# Patient Record
Sex: FEMALE | Race: WHITE | NOT HISPANIC OR LATINO | Employment: OTHER | URBAN - METROPOLITAN AREA
[De-identification: names, ages, dates, MRNs, and addresses within clinical notes are randomized per-mention and may not be internally consistent; named-entity substitution may affect disease eponyms.]

---

## 2020-10-04 ENCOUNTER — APPOINTMENT (EMERGENCY)
Dept: RADIOLOGY | Facility: HOSPITAL | Age: 40
End: 2020-10-04

## 2020-10-04 ENCOUNTER — HOSPITAL ENCOUNTER (EMERGENCY)
Facility: HOSPITAL | Age: 40
Discharge: HOME | End: 2020-10-04
Attending: EMERGENCY MEDICINE | Admitting: EMERGENCY MEDICINE

## 2020-10-04 VITALS
OXYGEN SATURATION: 100 % | HEART RATE: 71 BPM | WEIGHT: 289 LBS | SYSTOLIC BLOOD PRESSURE: 136 MMHG | RESPIRATION RATE: 16 BRPM | TEMPERATURE: 97.7 F | DIASTOLIC BLOOD PRESSURE: 83 MMHG

## 2020-10-04 DIAGNOSIS — S43.401A SPRAIN OF RIGHT SHOULDER: Primary | ICD-10-CM

## 2020-10-04 PROCEDURE — 99284 EMERGENCY DEPT VISIT MOD MDM: CPT | Performed by: EMERGENCY MEDICINE

## 2020-10-04 PROCEDURE — 73030 X-RAY EXAM OF SHOULDER: CPT

## 2020-10-04 PROCEDURE — 99283 EMERGENCY DEPT VISIT LOW MDM: CPT

## 2020-10-04 PROCEDURE — 73060 X-RAY EXAM OF HUMERUS: CPT

## 2020-10-04 RX ORDER — IBUPROFEN 400 MG/1
800 TABLET ORAL ONCE
Status: COMPLETED | OUTPATIENT
Start: 2020-10-04 | End: 2020-10-04

## 2020-10-04 RX ORDER — IBUPROFEN 800 MG/1
800 TABLET ORAL EVERY 6 HOURS PRN
Qty: 30 TABLET | Refills: 0 | Status: SHIPPED | OUTPATIENT
Start: 2020-10-04 | End: 2021-04-15 | Stop reason: ALTCHOICE

## 2020-10-04 RX ADMIN — IBUPROFEN 800 MG: 400 TABLET ORAL at 13:49

## 2021-04-15 ENCOUNTER — OFFICE VISIT (OUTPATIENT)
Dept: FAMILY MEDICINE CLINIC | Facility: CLINIC | Age: 41
End: 2021-04-15
Payer: COMMERCIAL

## 2021-04-15 ENCOUNTER — TELEPHONE (OUTPATIENT)
Dept: PSYCHIATRY | Facility: CLINIC | Age: 41
End: 2021-04-15

## 2021-04-15 VITALS
TEMPERATURE: 98 F | WEIGHT: 289 LBS | HEIGHT: 68 IN | BODY MASS INDEX: 43.8 KG/M2 | DIASTOLIC BLOOD PRESSURE: 84 MMHG | SYSTOLIC BLOOD PRESSURE: 112 MMHG | OXYGEN SATURATION: 98 %

## 2021-04-15 DIAGNOSIS — Z13.6 ENCOUNTER FOR LIPID SCREENING FOR CARDIOVASCULAR DISEASE: ICD-10-CM

## 2021-04-15 DIAGNOSIS — F33.1 MODERATE RECURRENT MAJOR DEPRESSION (HCC): Primary | ICD-10-CM

## 2021-04-15 DIAGNOSIS — Z12.31 ENCOUNTER FOR SCREENING MAMMOGRAM FOR BREAST CANCER: ICD-10-CM

## 2021-04-15 DIAGNOSIS — Z12.4 CERVICAL CANCER SCREENING: ICD-10-CM

## 2021-04-15 DIAGNOSIS — Z13.220 ENCOUNTER FOR LIPID SCREENING FOR CARDIOVASCULAR DISEASE: ICD-10-CM

## 2021-04-15 DIAGNOSIS — F17.200 TOBACCO DEPENDENCE: ICD-10-CM

## 2021-04-15 PROCEDURE — 99204 OFFICE O/P NEW MOD 45 MIN: CPT | Performed by: FAMILY MEDICINE

## 2021-04-15 PROCEDURE — 3725F SCREEN DEPRESSION PERFORMED: CPT | Performed by: FAMILY MEDICINE

## 2021-04-15 RX ORDER — DIPHENOXYLATE HYDROCHLORIDE AND ATROPINE SULFATE 2.5; .025 MG/1; MG/1
1 TABLET ORAL DAILY
COMMUNITY

## 2021-04-15 RX ORDER — VARENICLINE TARTRATE 25 MG
KIT ORAL
Qty: 53 TABLET | Refills: 0 | Status: SHIPPED | OUTPATIENT
Start: 2021-04-15 | End: 2021-04-19 | Stop reason: SDUPTHER

## 2021-04-15 RX ORDER — CITALOPRAM 10 MG/1
10 TABLET ORAL DAILY
Qty: 30 TABLET | Refills: 2 | Status: SHIPPED | OUTPATIENT
Start: 2021-04-15 | End: 2021-12-07

## 2021-04-15 RX ORDER — BUPROPION HYDROCHLORIDE 150 MG/1
150 TABLET ORAL EVERY MORNING
Qty: 30 TABLET | Refills: 5 | Status: CANCELLED | OUTPATIENT
Start: 2021-04-15

## 2021-04-15 RX ORDER — ERGOCALCIFEROL (VITAMIN D2) 1250 MCG
50000 CAPSULE ORAL 2 TIMES WEEKLY
COMMUNITY
Start: 2020-12-07
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
## 2021-04-15 NOTE — PATIENT INSTRUCTIONS
Depression   AMBULATORY CARE:   Depression  is a medical condition that causes feelings of sadness or hopelessness that do not go away  Depression may cause you to lose interest in things you used to enjoy  These feelings may interfere with your daily life  Common symptoms include the following:   · Appetite changes, or weight gain or loss    · Trouble going to sleep or staying asleep, or sleeping too much    · Fatigue or lack of energy    · Feeling restless, irritable, or withdrawn    · Feeling worthless, hopeless, discouraged, or guilty    · Trouble concentrating, remembering things, doing daily tasks, or making decisions    · Thoughts about hurting or killing yourself    Call your local emergency number (911 in the [address] [place] [address],[address]) if:   · You think about harming yourself or someone else  · You have done something on purpose to hurt yourself  Call your therapist or doctor if:   · Your symptoms do not improve  · You cannot make it to your next appointment  · You have new symptoms  · You have questions or concerns about your condition or care  The following resources are available at any time to help you, if needed:   · [address] [place] [address]: [phone] (3-877-886-VETW)     · Suicide Hotline: 3-639.241.5888 (4-117-JTRSKCK)     · For a list of international numbers: https://save org/find-help/international-resources/    Treatment for depression  may include medicine to relieve depression  Medicine is often used together with therapy  Therapy is a way for you to talk about your feelings and anything that may be causing depression  Therapy can be done alone or in a group  It may also be done with family members or a significant other  Self-care:   · Get regular physical activity  Try to be active for 30 minutes, 3 to 5 days a week  Physical activity can help relieve depression  Work with your healthcare provider to develop a plan that you enjoy   It may help to ask someone to be active with you  · Create a regular sleep schedule  A routine can help you relax before bed  Listen to music, read, or do yoga  Try to go to bed and wake up at the same time every day  Sleep is important for emotional health  · Eat a variety of healthy foods  Healthy foods include fruits, vegetables, whole-grain breads, low-fat dairy products, lean meats, fish, and cooked beans  A healthy meal plan is low in fat, salt, and added sugar  · Do not drink alcohol or use drugs  Alcohol and drugs can make depression worse  Talk to your therapist or doctor if you need help quitting  Follow up with your healthcare provider as directed: Your healthcare provider will monitor your progress at follow-up visits  He or she will also monitor your medicine if you take antidepressants  Your healthcare provider will ask if the medicine is helping  Tell him or her about any side effects or problems you may have with your medicine  The type or amount of medicine may need to be changed  Write down your questions so you remember to ask them during your visits  © Copyright [address] Information is for End User's use only and may not be sold, redistributed or otherwise used for commercial purposes  All illustrations and images included in CareNotes® are the copyrighted property of A D A M , Inc  or [address] [place], [place] [address]susie   The above information is an  only  It is not intended as medical advice for individual conditions or treatments  Talk to your doctor, nurse or pharmacist before following any medical regimen to see if it is safe and effective for you

## 2021-04-15 NOTE — TELEPHONE ENCOUNTER
Anibal Rm is looking to schedule an appointment for a Therapist  Patient has [facility] and can be reached anytime at [phone]  Patient does have a referral in the system

## 2021-04-19 ENCOUNTER — APPOINTMENT (OUTPATIENT)
Dept: LAB | Facility: HOSPITAL | Age: 41
End: 2021-04-19
Attending: FAMILY MEDICINE
Payer: COMMERCIAL

## 2021-04-19 DIAGNOSIS — F17.200 TOBACCO DEPENDENCE: ICD-10-CM

## 2021-04-19 DIAGNOSIS — Z13.220 ENCOUNTER FOR LIPID SCREENING FOR CARDIOVASCULAR DISEASE: ICD-10-CM

## 2021-04-19 DIAGNOSIS — F33.1 MODERATE RECURRENT MAJOR DEPRESSION (HCC): ICD-10-CM

## 2021-04-19 DIAGNOSIS — Z13.6 ENCOUNTER FOR LIPID SCREENING FOR CARDIOVASCULAR DISEASE: ICD-10-CM

## 2021-04-19 PROBLEM — E78.00 PURE HYPERCHOLESTEROLEMIA: Status: ACTIVE | Noted: 2021-04-19

## 2021-04-19 LAB
CHOLEST SERPL-MCNC: 206 MG/DL (ref 50–200)
HDLC SERPL-MCNC: 61 MG/DL
LDLC SERPL CALC-MCNC: 131 MG/DL (ref 0–100)
NONHDLC SERPL-MCNC: 145 MG/DL
TRIGL SERPL-MCNC: 69 MG/DL
TSH SERPL DL<=0.05 MIU/L-ACNC: 2.22 UIU/ML (ref 0.36–3.74)

## 2021-04-19 PROCEDURE — 36415 COLL VENOUS BLD VENIPUNCTURE: CPT

## 2021-04-19 PROCEDURE — 84443 ASSAY THYROID STIM HORMONE: CPT

## 2021-04-19 PROCEDURE — 80061 LIPID PANEL: CPT

## 2021-04-19 RX ORDER — VARENICLINE TARTRATE 25 MG
KIT ORAL
Qty: 53 TABLET | Refills: 0 | Status: SHIPPED | OUTPATIENT
Start: 2021-04-19 | End: 2021-05-13

## 2021-04-19 NOTE — TELEPHONE ENCOUNTER
Patient called stating her pharmacy did not receive the script for Chantix  Could you please resend?

## 2021-04-27 ENCOUNTER — HOSPITAL ENCOUNTER (OUTPATIENT)
Dept: MAMMOGRAPHY | Facility: CLINIC | Age: 41
Discharge: HOME | End: 2021-04-27
Payer: COMMERCIAL

## 2021-04-27 VITALS — HEIGHT: 68 IN | BODY MASS INDEX: 43.8 KG/M2 | WEIGHT: 289 LBS

## 2021-04-27 DIAGNOSIS — Z12.31 ENCOUNTER FOR SCREENING MAMMOGRAM FOR BREAST CANCER: ICD-10-CM

## 2021-04-27 PROCEDURE — 77067 SCR MAMMO BI INCL CAD: CPT

## 2021-04-27 PROCEDURE — 77063 BREAST TOMOSYNTHESIS BI: CPT

## 2021-04-29 ENCOUNTER — HOSPITAL ENCOUNTER (OUTPATIENT)
Dept: ULTRASOUND IMAGING | Facility: CLINIC | Age: 41
Discharge: HOME | End: 2021-04-29
Payer: COMMERCIAL

## 2021-04-29 ENCOUNTER — OFFICE VISIT (OUTPATIENT)
Dept: FAMILY MEDICINE CLINIC | Facility: CLINIC | Age: 41
End: 2021-04-29
Payer: COMMERCIAL

## 2021-04-29 VITALS
BODY MASS INDEX: 43.59 KG/M2 | HEART RATE: 83 BPM | SYSTOLIC BLOOD PRESSURE: 132 MMHG | OXYGEN SATURATION: 97 % | DIASTOLIC BLOOD PRESSURE: 84 MMHG | WEIGHT: 287.6 LBS | HEIGHT: 68 IN

## 2021-04-29 DIAGNOSIS — R92.8 ABNORMAL MAMMOGRAM: ICD-10-CM

## 2021-04-29 DIAGNOSIS — F17.200 TOBACCO DEPENDENCE: ICD-10-CM

## 2021-04-29 DIAGNOSIS — F33.1 MODERATE RECURRENT MAJOR DEPRESSION (HCC): Primary | ICD-10-CM

## 2021-04-29 PROCEDURE — 4004F PT TOBACCO SCREEN RCVD TLK: CPT | Performed by: FAMILY MEDICINE

## 2021-04-29 PROCEDURE — 3008F BODY MASS INDEX DOCD: CPT | Performed by: FAMILY MEDICINE

## 2021-04-29 PROCEDURE — 99213 OFFICE O/P EST LOW 20 MIN: CPT | Performed by: FAMILY MEDICINE

## 2021-04-29 PROCEDURE — 76642 ULTRASOUND BREAST LIMITED: CPT

## 2021-04-29 NOTE — PROGRESS NOTES
Assessment/Plan:    No problem-specific Assessment & Plan notes found for this encounter  Diagnoses and all orders for this visit:    Moderate recurrent major depression ([address] )  Taking Celexa and doing well  Denies side effects  Continue current dose and follow up  Tobacco dependence  Patient has been taking and tolerating the Chantix  She is still on her first week, smoking intermittently  Notes that she is smoking much less than usual          Subjective:      Patient ID: Carlos Crabtree is a 42 y o  female  HPI  Patient presents to the office for follow up on depression and smoking cessation  Patient states that she has been taking the Celexa and started the Chantix a few days ago  She feels that she is already calmer taking the Celexa  Notes that the Chantix seems to be helping her with craving  She has been smoking less  Notes that she has some craving stimulated by her partner smoking  Notes a little bit of chest tightness with cough this morning that has essentially resolved  She states that it feels like the "im quitting smoking cough"  The following portions of the patient's history were reviewed and updated as appropriate: allergies, current medications, past family history, past medical history, past social history, past surgical history and problem list     Review of Systems   Constitutional: Negative for chills, fatigue and fever  HENT: Negative for congestion, ear pain, postnasal drip, rhinorrhea, sinus pressure and sore throat  Respiratory: Positive for cough and chest tightness  Negative for shortness of breath  Cardiovascular: Negative for chest pain and leg swelling  Gastrointestinal: Negative for abdominal pain, constipation, diarrhea, nausea and vomiting  Skin: Negative for rash  Neurological: Negative for dizziness, light-headedness and headaches         Objective:    /84 (BP Location: Left arm, Patient Position: Sitting, Cuff Size: Large) Pulse 83   Ht 5' 8" (1 727 m)   Wt 130 kg (287 lb 9 6 oz)   LMP 04/05/2021   SpO2 97%   BMI 43 73 kg/m²      Physical Exam  Vitals signs reviewed  Constitutional:       General: She is not in acute distress  Appearance: Normal appearance  HENT:      Head: Normocephalic and atraumatic  Right Ear: External ear normal       Left Ear: External ear normal       Mouth/Throat:      Mouth: Mucous membranes are moist    Eyes:      Extraocular Movements: Extraocular movements intact  Conjunctiva/sclera: Conjunctivae normal    Cardiovascular:      Rate and Rhythm: Normal rate and regular rhythm  Heart sounds: Normal heart sounds  Pulmonary:      Effort: Pulmonary effort is normal       Breath sounds: Normal breath sounds  No stridor  No wheezing, rhonchi or rales  Chest:      Chest wall: No tenderness  Skin:     Capillary Refill: Capillary refill takes less than 2 seconds  Neurological:      Mental Status: She is alert  Mental status is at baseline            Dm Earl DO  [facility]  4/29/2021 2:15 PM

## 2021-05-03 ENCOUNTER — TELEPHONE (OUTPATIENT)
Dept: FAMILY MEDICINE CLINIC | Facility: CLINIC | Age: 41
End: 2021-05-03

## 2021-05-05 ENCOUNTER — IMMUNIZATIONS (OUTPATIENT)
Dept: FAMILY MEDICINE CLINIC | Facility: HOSPITAL | Age: 41
End: 2021-05-05

## 2021-05-05 DIAGNOSIS — Z23 ENCOUNTER FOR IMMUNIZATION: Primary | ICD-10-CM

## 2021-05-05 PROCEDURE — 0001A SARS-COV-2 / COVID-19 MRNA VACCINE (PFIZER-BIONTECH) 30 MCG: CPT

## 2021-05-05 PROCEDURE — 91300 SARS-COV-2 / COVID-19 MRNA VACCINE (PFIZER-BIONTECH) 30 MCG: CPT

## 2021-05-05 NOTE — TELEPHONE ENCOUNTER
Spoke with patient and she states that she does have congestion however it has improved after the rain today so she feels as though it is allergy related  Patient states that she does not have any fever or chills  Advised patient that per Dr Nicholas Swan unless she has any signs of infection it should improve as she continues not smoking  Advised patient to let us know if symptoms worsen  She verbalized understanding

## 2021-05-13 ENCOUNTER — OFFICE VISIT (OUTPATIENT)
Dept: FAMILY MEDICINE CLINIC | Facility: CLINIC | Age: 41
End: 2021-05-13
Payer: COMMERCIAL

## 2021-05-13 ENCOUNTER — TELEPHONE (OUTPATIENT)
Dept: FAMILY MEDICINE CLINIC | Facility: CLINIC | Age: 41
End: 2021-05-13

## 2021-05-13 VITALS
SYSTOLIC BLOOD PRESSURE: 110 MMHG | DIASTOLIC BLOOD PRESSURE: 82 MMHG | HEIGHT: 68 IN | WEIGHT: 285 LBS | OXYGEN SATURATION: 96 % | HEART RATE: 67 BPM | TEMPERATURE: 97.2 F | BODY MASS INDEX: 43.19 KG/M2

## 2021-05-13 DIAGNOSIS — Z71.6 ENCOUNTER FOR TOBACCO USE CESSATION COUNSELING: ICD-10-CM

## 2021-05-13 DIAGNOSIS — Z00.00 ANNUAL PHYSICAL EXAM: Primary | ICD-10-CM

## 2021-05-13 DIAGNOSIS — J45.20 MILD INTERMITTENT ASTHMA WITHOUT COMPLICATION: ICD-10-CM

## 2021-05-13 PROCEDURE — 4004F PT TOBACCO SCREEN RCVD TLK: CPT | Performed by: FAMILY MEDICINE

## 2021-05-13 PROCEDURE — 3725F SCREEN DEPRESSION PERFORMED: CPT | Performed by: FAMILY MEDICINE

## 2021-05-13 PROCEDURE — 99396 PREV VISIT EST AGE 40-64: CPT | Performed by: FAMILY MEDICINE

## 2021-05-13 PROCEDURE — 3008F BODY MASS INDEX DOCD: CPT | Performed by: FAMILY MEDICINE

## 2021-05-13 RX ORDER — CALCIUM GLUCONATE 45(500) MG
500 TABLET ORAL DAILY
COMMUNITY

## 2021-05-13 RX ORDER — ALBUTEROL SULFATE 90 UG/1
2 AEROSOL, METERED RESPIRATORY (INHALATION) EVERY 6 HOURS PRN
Qty: 8.5 G | Refills: 2 | Status: SHIPPED | OUTPATIENT
Start: 2021-05-13

## 2021-05-13 RX ORDER — VARENICLINE TARTRATE 1 MG/1
1 TABLET, FILM COATED ORAL 2 TIMES DAILY
Qty: 30 TABLET | Refills: 0 | Status: SHIPPED | OUTPATIENT
Start: 2021-05-13 | End: 2021-06-29 | Stop reason: SDUPTHER

## 2021-05-13 NOTE — TELEPHONE ENCOUNTER
T/c from pt - she just spoke with pharmacy and the following drugs are not covered by her plan:    saxenda (over 900$)  carencline    Pt wondering if something else could be sent in?

## 2021-05-13 NOTE — TELEPHONE ENCOUNTER
Pt called back and states that her Insurance will cover Ozempic with a prior auth  Pt would like to know if she can try Ozempic?

## 2021-05-13 NOTE — PATIENT INSTRUCTIONS

## 2021-05-14 RX ORDER — SEMAGLUTIDE 1.34 MG/ML
0.25 INJECTION, SOLUTION SUBCUTANEOUS WEEKLY
Qty: 1.5 ML | Refills: 2 | Status: SHIPPED | OUTPATIENT
Start: 2021-05-14

## 2021-05-14 NOTE — TELEPHONE ENCOUNTER
Order placed for Ozempic  Follow up as scheduled        Min Luna DO  [facility]  5/14/2021 7:54 AM

## 2021-05-19 ENCOUNTER — TELEPHONE (OUTPATIENT)
Dept: FAMILY MEDICINE CLINIC | Facility: CLINIC | Age: 41
End: 2021-05-19

## 2021-05-19 NOTE — TELEPHONE ENCOUNTER
Pharmacy called back help [phone]  ID [id number]    (no hx of tried and failed DM meds or weight loss medication from 12/2020 to present    Samaria pitts/cruzito

## 2021-05-25 NOTE — TELEPHONE ENCOUNTER
T/c to [facility] to try and do a verbal Prior Bryce Corcoran, Ref # [id number]  At first this was denied because pt is not a diabetic, but I informed Bridgette from [facility] that other weight loss meds were not covered by her plan either (according to previous message)  She then asked if she had tried diet and exercise and I quoted the o/v notes  Diet and Physical Activity  · Diet/Nutrition: well balanced diet and eating smaller portions and making better choice      · Exercise: no formal exercise and she jsut set up a gym at her house and she is starting to work out htis week       [employer] will pass the information to the pharmacist  Determination should be received within 72 hours via fax

## 2021-05-26 ENCOUNTER — IMMUNIZATIONS (OUTPATIENT)
Dept: FAMILY MEDICINE CLINIC | Facility: HOSPITAL | Age: 41
End: 2021-05-26

## 2021-05-26 DIAGNOSIS — Z23 ENCOUNTER FOR IMMUNIZATION: Primary | ICD-10-CM

## 2021-05-26 PROCEDURE — 0002A SARS-COV-2 / COVID-19 MRNA VACCINE (PFIZER-BIONTECH) 30 MCG: CPT

## 2021-05-26 PROCEDURE — 91300 SARS-COV-2 / COVID-19 MRNA VACCINE (PFIZER-BIONTECH) 30 MCG: CPT

## 2021-05-28 ENCOUNTER — TELEPHONE (OUTPATIENT)
Dept: FAMILY MEDICINE CLINIC | Facility: CLINIC | Age: 41
End: 2021-05-28

## 2021-05-28 DIAGNOSIS — R21 RASH: Primary | ICD-10-CM

## 2021-05-28 NOTE — TELEPHONE ENCOUNTER
Pt returned call back, I gave to pt  Dr Huntley's advices,  pt verbalized understanding and is asking Dr Huntley to contact the pt d/t some personal issues which pt would like to discuss  Please advice

## 2021-05-28 NOTE — TELEPHONE ENCOUNTER
A weight los medication is not covered by her plan, according to the prior auth denial  Referral to weight management placed  Allergy panel placed  We do not to scratch test, she would need to see allergist  We can determine need for allergist after allergy panel results       Saurabh Baca DO  [facility]  5/28/2021 3:57 PM

## 2021-05-28 NOTE — TELEPHONE ENCOUNTER
Returned call  Patient has no concerns to discuss  Appreciative of call       Paul Church DO  [facility]  5/28/2021 4:35 PM

## 2021-05-28 NOTE — TELEPHONE ENCOUNTER
Pt called very upset that we have not got back to her about her Ozempic denial from her insurance  She would like to know what is the next step for her? Also she c/o of getting a rash all over her body and she doesn't know why? She asked if you do the ''scratch test'' or if you can order an allergy panel for her  Please advise  Constanza

## 2021-06-29 DIAGNOSIS — Z71.6 ENCOUNTER FOR TOBACCO USE CESSATION COUNSELING: ICD-10-CM

## 2021-06-29 RX ORDER — VARENICLINE TARTRATE 1 MG/1
1 TABLET, FILM COATED ORAL 2 TIMES DAILY
Qty: 30 TABLET | Refills: 0 | Status: SHIPPED | OUTPATIENT
Start: 2021-06-29 | End: 2021-07-23

## 2021-07-19 ENCOUNTER — CONSULT (OUTPATIENT)
Dept: BARIATRICS | Facility: CLINIC | Age: 41
End: 2021-07-19
Payer: COMMERCIAL

## 2021-07-19 VITALS
DIASTOLIC BLOOD PRESSURE: 88 MMHG | HEIGHT: 67 IN | BODY MASS INDEX: 45.89 KG/M2 | HEART RATE: 84 BPM | SYSTOLIC BLOOD PRESSURE: 138 MMHG | WEIGHT: 292.4 LBS

## 2021-07-19 DIAGNOSIS — E78.00 ELEVATED LDL CHOLESTEROL LEVEL: ICD-10-CM

## 2021-07-19 DIAGNOSIS — F17.200 TOBACCO DEPENDENCE: ICD-10-CM

## 2021-07-19 DIAGNOSIS — R63.5 ABNORMAL WEIGHT GAIN: ICD-10-CM

## 2021-07-19 DIAGNOSIS — F33.1 MODERATE RECURRENT MAJOR DEPRESSION (HCC): ICD-10-CM

## 2021-07-19 PROCEDURE — 4004F PT TOBACCO SCREEN RCVD TLK: CPT | Performed by: INTERNAL MEDICINE

## 2021-07-19 PROCEDURE — 99245 OFF/OP CONSLTJ NEW/EST HI 55: CPT | Performed by: INTERNAL MEDICINE

## 2021-07-19 PROCEDURE — 3008F BODY MASS INDEX DOCD: CPT | Performed by: INTERNAL MEDICINE

## 2021-07-19 NOTE — PROGRESS NOTES
Assessment/Plan:  Stuart Espinosa was seen today for consult  Diagnoses and all orders for this visit:    Moderate recurrent major depression ([facility] [address] )  Currently off Celexa as she will be resuming Chantix    Tobacco dependence  Pt to resume Chantix  Didn't tolerate Wellbutrin    Elevated LDL cholesterol level  Abnormal weight gain  BMI 40 0-44 9, adult ([facility] [address] )  -     Ambulatory referral to Weight Management  - Discussed options of HealthyCORE-Intensive Lifestyle Intervention Program, Very Low Calorie Diet-VLCD, Conservative Program, Alvin-En-Y Gastric Bypass and Vertical Sleeve Gastrectomy and the role of weight loss medications  - Explained the importance of making lifestyle changes first before starting any anti-obesity medications  Patient should demonstrate lifestyle changes first before anti-obesity medication can be initiated  - Patient is interested in pursuing Very Low Calorie Diet-VLCD  - Initial weight loss goal of 5-10% weight loss for improved health  - Screening labs    Labs ordered: CMP, Mg  HTN meds addressed: N/A  DM2 meds addressed: N/A  VLCD time restriction based on BMI: No  EKG: ordered  Contraceptive method: No  Pregnancy test required: Yes  Discussed with patient alcohol is not permitted while on VLCD  Goals:  Do not skip any meals! Food log (ie ) www myfitnesspal com,sparkpeople  com,loseit com,calorieking  com,etc  baritastic (use skinnytaste  com or smartphone gregorio MediSwipe for recipes)  No sugary beverages  At least 64oz of water daily  Increase physical activity by 10 minutes daily  Gradually increase physical activity to a goal of 5 days per week for 30 minutes of MODERATE intensity PLUS 2 days per week of FULL BODY resistance training (use smartphone apps Tutor Universe, Home Workout, etc )    45 minute visit, >50% face-to-face time spent counseling patient on surgical and nonsurgical interventions for the treatment of excess weight    Discussed the advantages and long-term outcomes with regards to bariatric surgery  Discussed in detail nonsurgical options including intensive lifestyle intervention program, very low-calorie diet program and conservative program   Discussed the role of weight loss medications  Counseled patient on diet behavior and exercise modification for weight loss  Follow up in approximately 2 weeks with Non-Surgical Dietician  Subjective:   Chief Complaint   Patient presents with    Consult     Patient is here for initial MWM consult with Dr Cristine Johnson  BMI        Patient ID: Toby Ortiz  is a 42 y o  female with excess weight/obesity here to pursue weight management      Past Medical History:   Diagnosis Date    Broken shoulder     Morbid obesity with BMI of 40 0-44 9, adult ([address] )     Torn rotator cuff      Past Surgical History:   Procedure Laterality Date    CYST REMOVAL      ROTATOR CUFF REPAIR  10/22/2020    SHOULDER SURGERY  2020       HPI:  Wt Readings from Last 20 Encounters:   21 133 kg (292 lb 6 4 oz)   21 129 kg (285 lb)   21 130 kg (287 lb 9 6 oz)   21 131 kg (289 lb)   04/15/21 131 kg (289 lb)   10/04/20 131 kg (289 lb)       Severity: Severe  Onset: over the years   Modifiers: Diet and Exercise, keto   Contributing factors: Poor Food Choices and Insufficient Physical Activity  Associated symptoms: comorbid conditions  Goal weight:     Tried wellbutrin for tobacco cessation but didn't work for her- felt manic  Chantix helper her quit but resumed smoking again  Takes care of her grandfather, stressed a lot   Doesn't feel she gets enough support from her family    B- skips on days off; if works [employer] or yogurt/fruit   S-  L- fast food/ soup and sandwich  S-  D- fish/chicken, veggies, sometimes starch  S-    Hydration: 20oz-3L water   Alcohol: wine sometimes  Smokin/2 PPD  Exercise: no   Sleep: interrupted 6 hours  STOP bang: 3/8    The following portions of the patient's history were reviewed and updated as appropriate: allergies, current medications, past family history, past medical history, past social history, past surgical history, and problem list     Review of Systems   Constitutional: Negative for appetite change, chills and fever  HENT: Negative for rhinorrhea and sore throat  Respiratory: Positive for shortness of breath (with exertion)  Negative for cough and chest tightness  Cardiovascular: Positive for leg swelling  Negative for chest pain  Gastrointestinal: Positive for nausea  Negative for abdominal pain, constipation, diarrhea and vomiting  Endocrine: Negative for cold intolerance and heat intolerance  Genitourinary: Negative for difficulty urinating  Musculoskeletal: Positive for arthralgias and back pain  Skin: Negative for color change  Neurological: Positive for dizziness and headaches  Negative for numbness  Psychiatric/Behavioral: Positive for sleep disturbance  The patient is nervous/anxious  All other systems reviewed and are negative  Objective:  /88 (BP Location: Left arm, Patient Position: Sitting, Cuff Size: Large)   Pulse 84   Ht 5' 7" (1 702 m)   Wt 133 kg (292 lb 6 4 oz)   BMI 45 80 kg/m²   Constitutional: Well-developed, well-nourished and obese Body mass index is 45 8 kg/m²  Selene Sole HEENT: No conjunctival pallor or jaundice  Pulmonary: No increased work of breathing or signs of respiratory distress  CV: Normal rate, well-perfused  GI: Obese  Non-distended  MSK: No edema   Neuro: Oriented to person, place and time  Normal Speech  Normal gait  Psych: Normal affect and mood       Labs and Imaging  No results found for: WBC, HGB, HCT, MCV, PLT  No results found for: NA, SODIUM, K, CL, CO2, ANIONGAP, AGAP, BUN, CREATININE, GLUC, GLUF, CALCIUM, AST, ALT, ALKPHOS, PROT, TP, BILITOT, TBILI, EGFR  No results found for: HGBA1C  Lab Results   Component Value Date    AOB9HKZBNFLT 2 217 04/19/2021     Lab Results   Component Value Date    CHOLESTEROL 206 (H) 04/19/2021 Lab Results   Component Value Date    HDL 61 04/19/2021     Lab Results   Component Value Date    TRIG 69 04/19/2021     No results found for: LDLDIRECT

## 2021-07-23 DIAGNOSIS — Z71.6 ENCOUNTER FOR TOBACCO USE CESSATION COUNSELING: ICD-10-CM

## 2021-07-23 RX ORDER — VARENICLINE TARTRATE 1 MG/1
TABLET, FILM COATED ORAL
Qty: 30 TABLET | Refills: 0 | Status: SHIPPED | OUTPATIENT
Start: 2021-07-23

## 2021-08-23 ENCOUNTER — TELEPHONE (OUTPATIENT)
Dept: FAMILY MEDICINE CLINIC | Facility: CLINIC | Age: 41
End: 2021-08-23

## 2021-08-23 NOTE — TELEPHONE ENCOUNTER
T/c from pt --- did successfully quit smoking with chantix but started again and wanted to start chantix to quit for good, but now it is on recall  Was advised by the pharmacy to call Dr Alexander Lo for a different med --- was told there is a very close/similar replacement she can be given -- please note, pt is requesting a 2 month supply so it can help her quit for good, and also does not want wellbutrin

## 2021-12-07 DIAGNOSIS — F33.1 MODERATE RECURRENT MAJOR DEPRESSION (HCC): ICD-10-CM

## 2021-12-07 RX ORDER — CITALOPRAM 10 MG/1
TABLET ORAL
Qty: 30 TABLET | Refills: 0 | Status: SHIPPED | OUTPATIENT
Start: 2021-12-07 | End: 2022-01-15

## 2021-12-08 ENCOUNTER — TELEPHONE (OUTPATIENT)
Dept: FAMILY MEDICINE CLINIC | Facility: CLINIC | Age: 41
End: 2021-12-08

## 2022-01-11 ENCOUNTER — TELEMEDICINE (OUTPATIENT)
Dept: FAMILY MEDICINE CLINIC | Facility: CLINIC | Age: 42
End: 2022-01-11
Payer: COMMERCIAL

## 2022-01-11 VITALS — BODY MASS INDEX: 45.83 KG/M2 | WEIGHT: 292 LBS | HEIGHT: 67 IN

## 2022-01-11 DIAGNOSIS — Z20.822 EXPOSURE TO COVID-19 VIRUS: ICD-10-CM

## 2022-01-11 DIAGNOSIS — B34.9 VIRAL INFECTION, UNSPECIFIED: ICD-10-CM

## 2022-01-11 PROCEDURE — U0003 INFECTIOUS AGENT DETECTION BY NUCLEIC ACID (DNA OR RNA); SEVERE ACUTE RESPIRATORY SYNDROME CORONAVIRUS 2 (SARS-COV-2) (CORONAVIRUS DISEASE [COVID-19]), AMPLIFIED PROBE TECHNIQUE, MAKING USE OF HIGH THROUGHPUT TECHNOLOGIES AS DESCRIBED BY CMS-2020-01-R: HCPCS | Performed by: FAMILY MEDICINE

## 2022-01-11 PROCEDURE — 99213 OFFICE O/P EST LOW 20 MIN: CPT | Performed by: FAMILY MEDICINE

## 2022-01-11 PROCEDURE — U0005 INFEC AGEN DETEC AMPLI PROBE: HCPCS | Performed by: FAMILY MEDICINE

## 2022-01-11 NOTE — PROGRESS NOTES
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  Encounter provider Diana Cardona DO    Provider located at [facility] [address]  [address]  [phone]    Recent Visits  No visits were found meeting these conditions  Showing recent visits within past 7 days and meeting all other requirements  Today's Visits  Date Type Provider Dept   01/11/22 Telemedicine Diana Cardona DO  [facility] [address]   Showing today's visits and meeting all other requirements  Future Appointments  No visits were found meeting these conditions  Showing future appointments within next 150 days and meeting all other requirements     This virtual check-in was done via [employer] and patient was informed that this is a secure, HIPAA-compliant platform  She agrees to proceed  Patient agrees to participate in a virtual check in via telephone or video visit instead of presenting to the office to address urgent/immediate medical needs  Patient is aware this is a billable service  After connecting through [facility], the patient was identified by name and date of birth  Chungjaymie Park was informed that this was a telemedicine visit and that the exam was being conducted confidentially over secure lines  My office door was closed  No one else was in the room  Chung Park acknowledged consent and understanding of privacy and security of the telemedicine visit   I informed the patient that I have reviewed her record in Epic and presented the opportunity for her to ask any questions regarding the visit today  The patient agreed to participate  Verification of patient location:  Patient is located in the following state in which I hold an active license: [place]    Subjective:   Edu Reynaga is a 42 y o  female who is concerned about COVID-19  Patient's symptoms include chills, fatigue, nasal congestion, sore throat, cough, shortness of breath, myalgias and headache  Patient denies fever, malaise, rhinorrhea, anosmia, loss of taste, chest tightness, abdominal pain, nausea, vomiting and diarrhea  - Date of symptom onset: 1/10/2022      COVID-19 vaccination status: Fully vaccinated (primary series)    Exposure:   Contact with a person who is under investigation (PUI) for or who is positive for COVID-19 within the last 14 days?: Yes    Hospitalized recently for fever and/or lower respiratory symptoms?: No      Currently a healthcare worker that is involved in direct patient care?: No      Works in a special setting where the risk of COVID-19 transmission may be high? (this may include long-term care, correctional and longterm facilities; homeless shelters; assisted-living facilities and group homes ): No      Resident in a special setting where the risk of COVID-19 transmission may be high? (this may include long-term care, correctional and longterm facilities; homeless shelters; assisted-living facilities and group homes ): No      Partner is sick at home, has close [place] exposure (not tested)      No results found for: Agustina Holman, [address] [facility], 350 Count includes the [facility]  Past Medical History:   Diagnosis Date    Broken shoulder     Morbid obesity with BMI of 40 0-44 9, adult ([address] )     Torn rotator cuff      Past Surgical History:   Procedure Laterality Date    CYST REMOVAL      ROTATOR CUFF REPAIR  10/22/2020    SHOULDER SURGERY  12/22/2020     Current Outpatient Medications   Medication Sig Dispense Refill    albuterol (ProAir HFA) 90 mcg/act inhaler Inhale 2 puffs every 6 (six) hours as needed for wheezing 8 5 g 2    calcium gluconate 500 mg tablet Take 500 mg by mouth daily      citalopram (CeleXA) 10 mg tablet TAKE ONE TABLET BY MOUTH EVERY DAY 30 tablet 0    co-enzyme Q-10 30 MG capsule Take 30 mg by mouth daily      COLLAGEN PO Take 1 capsule by mouth daily      Fish Oil-Cholecalciferol (OMEGA-3 GUMMIES PO) Take 1 tablet by mouth daily       multivitamin (THERAGRAN) TABS Take 1 tablet by mouth daily      Chantix 1 MG tablet TAKE 1 TABLET BY MOUTH TWICE A DAY 30 tablet 0    ergocalciferol (Drisdol) 1 25 MG (57881 UT) capsule Take 50,000 Units by mouth 2 (two) times a week       Semaglutide,0 25 or 0 5MG/DOS, (Ozempic, 0 25 or 0 5 MG/DOSE,) 2 MG/1 5ML SOPN Inject 0 25 mg under the skin once a week (Patient not taking: Reported on 7/19/2021) 1 5 mL 2     No current facility-administered medications for this visit  Allergies   Allergen Reactions    Avocado - Food Allergy Angioedema    Peanut-Containing Drug Products - Food Allergy        Review of Systems   Constitutional: Positive for chills and fatigue  Negative for fever  HENT: Positive for congestion and sore throat  Negative for rhinorrhea  Respiratory: Positive for cough and shortness of breath  Negative for chest tightness  Gastrointestinal: Negative for abdominal pain, diarrhea, nausea and vomiting  Musculoskeletal: Positive for myalgias  Neurological: Positive for headaches  Objective:    Vitals:    01/11/22 1320   Weight: 132 kg (292 lb)   Height: 5' 7" (1 702 m)       Physical Exam  Vitals reviewed  Constitutional:       General: She is not in acute distress  Appearance: Normal appearance  HENT:      Head: Normocephalic and atraumatic  Right Ear: External ear normal       Left Ear: External ear normal       Nose: Congestion present        Mouth/Throat: Mouth: Mucous membranes are moist    Eyes:      Extraocular Movements: Extraocular movements intact  Conjunctiva/sclera: Conjunctivae normal    Pulmonary:      Effort: Pulmonary effort is normal  No respiratory distress  Neurological:      Mental Status: She is alert  Mental status is at baseline  VIRTUAL VISIT DISCLAIMER    Emmie Thomasonjuno Melissa verbally agrees to participate in [employer]  Pt is aware that [address] could be limited without vital signs or the ability to perform a full hands-on physical Merle Locketty understands she or the provider may request at any time to terminate the video visit and request the patient to seek care or treatment in person      Tony Martin DO  [facility]  1/11/2022 2:22 PM

## 2022-01-13 LAB — SARS-COV-2 RNA RESP QL NAA+PROBE: NEGATIVE

## 2022-01-15 DIAGNOSIS — F33.1 MODERATE RECURRENT MAJOR DEPRESSION (HCC): ICD-10-CM

## 2022-01-15 RX ORDER — CITALOPRAM 10 MG/1
TABLET ORAL
Qty: 30 TABLET | Refills: 0 | Status: SHIPPED | OUTPATIENT
Start: 2022-01-15 | End: 2022-06-02

## 2022-06-02 DIAGNOSIS — F33.1 MODERATE RECURRENT MAJOR DEPRESSION (HCC): ICD-10-CM

## 2022-06-02 RX ORDER — CITALOPRAM 10 MG/1
TABLET ORAL
Qty: 30 TABLET | Refills: 0 | Status: SHIPPED | OUTPATIENT
Start: 2022-06-02

## 2022-06-07 ENCOUNTER — TELEPHONE (OUTPATIENT)
Dept: PSYCHIATRY | Facility: CLINIC | Age: 42
End: 2022-06-07

## 2022-08-31 ENCOUNTER — TELEPHONE (OUTPATIENT)
Dept: FAMILY MEDICINE CLINIC | Facility: CLINIC | Age: 42
End: 2022-08-31

## 2022-08-31 DIAGNOSIS — F17.210 CIGARETTE NICOTINE DEPENDENCE WITHOUT COMPLICATION: Primary | ICD-10-CM

## 2022-08-31 NOTE — TELEPHONE ENCOUNTER
I spoke with pt to clarify her request  Pt states that she has taken Chantix in the past and has tolerated it well for smoking cessation purposes  Pt states that Chantix (brand name) is no longer available, so she would like the generic Varenicline sent in as she is ready to quit smoking again  Pt has tried Wellbutrin in the past with unwanted side effects but tolerated Chantix  Can rx for starter pack of generic Chantix be sent in

## 2022-08-31 NOTE — TELEPHONE ENCOUNTER
The alternative would be to use Wellbutrin, has patient had a seizure during her life?     Tessa Deleon DO  [facility]  8/31/2022 11:25 AM

## 2022-09-01 DIAGNOSIS — F17.210 CIGARETTE NICOTINE DEPENDENCE WITHOUT COMPLICATION: ICD-10-CM

## 2022-09-01 NOTE — TELEPHONE ENCOUNTER
Pt notified, and she requested that rx be resent to [facility] in [place]  Rx resent

## 2022-09-01 NOTE — TELEPHONE ENCOUNTER
PAGER ID: [phone]   MESSAGE: pt in [address] b/p is low at 84/53 with a map of 64 he was sleeping and states he feels fine. He has been getting Diamox. Thanks Heather [phone]    Above text sent to Dr. Infante and Arvind   Yes, chantix generic sent it       Dm Earl DO  [facility]  9/1/2022 7:55 AM

## 2022-10-06 DIAGNOSIS — F17.210 CIGARETTE NICOTINE DEPENDENCE WITHOUT COMPLICATION: ICD-10-CM

## 2022-10-07 RX ORDER — VARENICLINE TARTRATE 1 MG/1
1 TABLET, FILM COATED ORAL 2 TIMES DAILY
Qty: 180 TABLET | Refills: 0 | Status: SHIPPED | OUTPATIENT
Start: 2022-10-07 | End: 2023-01-05

## 2022-11-04 ENCOUNTER — VBI (OUTPATIENT)
Dept: ADMINISTRATIVE | Facility: OTHER | Age: 42
End: 2022-11-04

## 2022-12-04 ENCOUNTER — APPOINTMENT (EMERGENCY)
Dept: RADIOLOGY | Facility: HOSPITAL | Age: 42
End: 2022-12-04

## 2022-12-04 ENCOUNTER — APPOINTMENT (EMERGENCY)
Dept: CT IMAGING | Facility: HOSPITAL | Age: 42
End: 2022-12-04

## 2022-12-04 ENCOUNTER — HOSPITAL ENCOUNTER (EMERGENCY)
Facility: HOSPITAL | Age: 42
Discharge: HOME | End: 2022-12-04
Attending: EMERGENCY MEDICINE | Admitting: EMERGENCY MEDICINE

## 2022-12-04 VITALS
BODY MASS INDEX: 45.99 KG/M2 | HEART RATE: 82 BPM | RESPIRATION RATE: 18 BRPM | OXYGEN SATURATION: 96 % | WEIGHT: 293 LBS | TEMPERATURE: 98 F | HEIGHT: 67 IN | DIASTOLIC BLOOD PRESSURE: 72 MMHG | SYSTOLIC BLOOD PRESSURE: 150 MMHG

## 2022-12-04 DIAGNOSIS — R07.9 CHEST PAIN: Primary | ICD-10-CM

## 2022-12-04 LAB
2HR DELTA HS TROPONIN: 1 NG/L
ALBUMIN SERPL BCP-MCNC: 3.8 G/DL (ref 3.5–5)
ALP SERPL-CCNC: 84 U/L (ref 46–116)
ALT SERPL W P-5'-P-CCNC: 24 U/L (ref 12–78)
ANION GAP SERPL CALCULATED.3IONS-SCNC: 10 MMOL/L (ref 4–13)
AST SERPL W P-5'-P-CCNC: 39 U/L (ref 5–45)
BACTERIA UR QL AUTO: ABNORMAL /HPF
BASOPHILS # BLD AUTO: 0.06 THOUSANDS/ÂΜL (ref 0–0.1)
BASOPHILS NFR BLD AUTO: 1 % (ref 0–1)
BILIRUB SERPL-MCNC: 0.5 MG/DL (ref 0.2–1)
BILIRUB UR QL STRIP: NEGATIVE
BUN SERPL-MCNC: 11 MG/DL (ref 5–25)
CALCIUM SERPL-MCNC: 8.8 MG/DL (ref 8.3–10.1)
CARDIAC TROPONIN I PNL SERPL HS: 3 NG/L
CARDIAC TROPONIN I PNL SERPL HS: 4 NG/L
CHLORIDE SERPL-SCNC: 105 MMOL/L (ref 96–108)
CLARITY UR: ABNORMAL
CO2 SERPL-SCNC: 25 MMOL/L (ref 21–32)
COLOR UR: YELLOW
CREAT SERPL-MCNC: 0.88 MG/DL (ref 0.6–1.3)
D DIMER PPP FEU-MCNC: 0.56 UG/ML FEU
EOSINOPHIL # BLD AUTO: 0.05 THOUSAND/ÂΜL (ref 0–0.61)
EOSINOPHIL NFR BLD AUTO: 1 % (ref 0–6)
ERYTHROCYTE [DISTWIDTH] IN BLOOD BY AUTOMATED COUNT: 12.4 % (ref 11.6–15.1)
EXT PREGNANCY TEST URINE: NEGATIVE
EXT. CONTROL: NORMAL
FLUAV RNA RESP QL NAA+PROBE: NEGATIVE
FLUBV RNA RESP QL NAA+PROBE: NEGATIVE
GFR SERPL CREATININE-BSD FRML MDRD: 80 ML/MIN/1.73SQ M
GLUCOSE SERPL-MCNC: 109 MG/DL (ref 65–140)
GLUCOSE UR STRIP-MCNC: NEGATIVE MG/DL
HCT VFR BLD AUTO: 42.9 % (ref 34.8–46.1)
HGB BLD-MCNC: 14.4 G/DL (ref 11.5–15.4)
HGB UR QL STRIP.AUTO: ABNORMAL
IMM GRANULOCYTES # BLD AUTO: 0.02 THOUSAND/UL (ref 0–0.2)
IMM GRANULOCYTES NFR BLD AUTO: 0 % (ref 0–2)
KETONES UR STRIP-MCNC: NEGATIVE MG/DL
LEUKOCYTE ESTERASE UR QL STRIP: ABNORMAL
LYMPHOCYTES # BLD AUTO: 1.12 THOUSANDS/ÂΜL (ref 0.6–4.47)
LYMPHOCYTES NFR BLD AUTO: 15 % (ref 14–44)
MCH RBC QN AUTO: 29.3 PG (ref 26.8–34.3)
MCHC RBC AUTO-ENTMCNC: 33.6 G/DL (ref 31.4–37.4)
MCV RBC AUTO: 87 FL (ref 82–98)
MONOCYTES # BLD AUTO: 0.33 THOUSAND/ÂΜL (ref 0.17–1.22)
MONOCYTES NFR BLD AUTO: 4 % (ref 4–12)
NEUTROPHILS # BLD AUTO: 6.08 THOUSANDS/ÂΜL (ref 1.85–7.62)
NEUTS SEG NFR BLD AUTO: 79 % (ref 43–75)
NITRITE UR QL STRIP: NEGATIVE
NON-SQ EPI CELLS URNS QL MICRO: ABNORMAL /HPF
NRBC BLD AUTO-RTO: 0 /100 WBCS
PH UR STRIP.AUTO: 5.5 [PH]
PLATELET # BLD AUTO: 327 THOUSANDS/UL (ref 149–390)
PMV BLD AUTO: 10.1 FL (ref 8.9–12.7)
POTASSIUM SERPL-SCNC: 3.8 MMOL/L (ref 3.5–5.3)
PROT SERPL-MCNC: 7.8 G/DL (ref 6.4–8.4)
PROT UR STRIP-MCNC: NEGATIVE MG/DL
RBC # BLD AUTO: 4.91 MILLION/UL (ref 3.81–5.12)
RBC #/AREA URNS AUTO: ABNORMAL /HPF
RSV RNA RESP QL NAA+PROBE: NEGATIVE
SARS-COV-2 RNA RESP QL NAA+PROBE: NEGATIVE
SODIUM SERPL-SCNC: 140 MMOL/L (ref 135–147)
SP GR UR STRIP.AUTO: 1.02 (ref 1–1.03)
UROBILINOGEN UR QL STRIP.AUTO: 0.2 E.U./DL
WBC # BLD AUTO: 7.66 THOUSAND/UL (ref 4.31–10.16)
WBC #/AREA URNS AUTO: ABNORMAL /HPF

## 2022-12-04 RX ADMIN — IOHEXOL 85 ML: 350 INJECTION, SOLUTION INTRAVENOUS at 18:58

## 2022-12-05 LAB
ATRIAL RATE: 71 BPM
P AXIS: 62 DEGREES
PR INTERVAL: 132 MS
QRS AXIS: 82 DEGREES
QRSD INTERVAL: 94 MS
QT INTERVAL: 396 MS
QTC INTERVAL: 430 MS
T WAVE AXIS: 42 DEGREES
VENTRICULAR RATE: 71 BPM

## 2022-12-06 ENCOUNTER — OFFICE VISIT (OUTPATIENT)
Dept: FAMILY MEDICINE CLINIC | Facility: CLINIC | Age: 42
End: 2022-12-06

## 2022-12-06 VITALS
BODY MASS INDEX: 45.99 KG/M2 | HEIGHT: 67 IN | TEMPERATURE: 98.2 F | DIASTOLIC BLOOD PRESSURE: 84 MMHG | SYSTOLIC BLOOD PRESSURE: 142 MMHG | WEIGHT: 293 LBS | HEART RATE: 91 BPM | OXYGEN SATURATION: 98 %

## 2022-12-06 DIAGNOSIS — R07.9 CHEST PAIN, UNSPECIFIED TYPE: ICD-10-CM

## 2022-12-06 DIAGNOSIS — N92.6 ABNORMAL MENSES: Primary | ICD-10-CM

## 2022-12-06 DIAGNOSIS — R06.02 SHORTNESS OF BREATH: ICD-10-CM

## 2022-12-06 DIAGNOSIS — Z12.31 BREAST CANCER SCREENING BY MAMMOGRAM: ICD-10-CM

## 2022-12-06 DIAGNOSIS — F41.1 GAD (GENERALIZED ANXIETY DISORDER): ICD-10-CM

## 2022-12-06 NOTE — PROGRESS NOTES
Name: Sosa Pete      : 1980      MRN: [id number]  Encounter Provider: Alexandra Bonner DO  Encounter Date: 2022   Encounter department: [facility]     1  Abnormal menses  -     Ambulatory Referral to Obstetrics / Gynecology; Future    2  Breast cancer screening by mammogram  -     Mammo screening bilateral w 3d & cad; Future; Expected date: 2022    3  Shortness of breath  -     Complete PFT with post bronchodilator; Future  -     Echo stress test, exercise; Future; Expected date: 2022    4  Chest pain, unspecified type  -     Echo stress test, exercise; Future; Expected date: 2022    5  LARISA (generalized anxiety disorder)  -     Ambulatory Referral to [facility]; Future    Subjective      HPI     Notes that she was having chest tightness and SOB for about 4 days  States that this was left sided  States that this started in October  SOB happens on exertion and at rest  Chest pain happen on exertion  She was not having much improvement from her inhaler  She had at [facility] that was negative  Notes smoking history of 29-30 years  She has quit smoking since 22  Notes that her anxiety and stress level is through the roof  States that her grandfather is not doing well  States that she has has some suicidal thoughts when on the Chantix the last few months  She has been very overwhelmed with care giving and having concerns about her own health  States that she is worried that she is allergic to her cats, she has 6 cats  She is congested and having nasal fullness for 20 years  Notes that she is not seeing a therapist at this point in time  Family history of CAD, reports no men on her fathers side have lived beyond their 62s       Review of Systems    Current Outpatient Medications on File Prior to Visit   Medication Sig   • albuterol (ProAir HFA) 90 mcg/act inhaler Inhale 2 puffs every 6 (six) hours as needed for wheezing   • calcium gluconate 500 mg tablet Take 500 mg by mouth daily   • citalopram (CeleXA) 10 mg tablet TAKE ONE TABLET BY MOUTH EVERY DAY   • COLLAGEN PO Take 1 capsule by mouth daily   • multivitamin (THERAGRAN) TABS Take 1 tablet by mouth daily   • co-enzyme Q-10 30 MG capsule Take 30 mg by mouth daily (Patient not taking: Reported on 12/6/2022)   • ergocalciferol (ERGOCALCIFEROL) 1 25 MG (84766 UT) capsule Take 50,000 Units by mouth 2 (two) times a week  (Patient not taking: Reported on 12/6/2022)   • Fish Oil-Cholecalciferol (OMEGA-3 GUMMIES PO) Take 1 tablet by mouth daily  (Patient not taking: Reported on 12/6/2022)     Objective     /84 (BP Location: Left arm, Patient Position: Sitting, Cuff Size: Large)   Pulse 91   Temp 98 2 °F (36 8 °C)   Ht 5' 7" (1 702 m)   Wt (!) 137 kg (303 lb)   SpO2 98%   BMI 47 46 kg/m²     Physical Exam  Vitals reviewed  Constitutional:       General: She is not in acute distress  Appearance: Normal appearance  She is obese  HENT:      Head: Normocephalic and atraumatic  Right Ear: External ear normal       Left Ear: External ear normal       Nose: Nose normal       Mouth/Throat:      Mouth: Mucous membranes are moist    Eyes:      Extraocular Movements: Extraocular movements intact  Conjunctiva/sclera: Conjunctivae normal    Cardiovascular:      Rate and Rhythm: Normal rate and regular rhythm  Heart sounds: Normal heart sounds  Pulmonary:      Effort: Pulmonary effort is normal       Breath sounds: Normal breath sounds  No wheezing, rhonchi or rales  Abdominal:      General: Bowel sounds are normal  There is no distension  Palpations: Abdomen is soft  Tenderness: There is no abdominal tenderness  Musculoskeletal:         General: No deformity  Cervical back: Neck supple  Right lower leg: No edema  Left lower leg: No edema     Lymphadenopathy:      Cervical: No cervical adenopathy  Skin:     General: Skin is warm  Capillary Refill: Capillary refill takes less than 2 seconds  Findings: No rash  Neurological:      Mental Status: She is alert  Mental status is at baseline         Tessa Deleon DO

## 2022-12-08 ENCOUNTER — TELEPHONE (OUTPATIENT)
Dept: PSYCHIATRY | Facility: CLINIC | Age: 42
End: 2022-12-08

## 2023-01-19 ENCOUNTER — HOSPITAL ENCOUNTER (OUTPATIENT)
Dept: NON INVASIVE DIAGNOSTICS | Facility: HOSPITAL | Age: 43
Discharge: HOME | End: 2023-01-19
Attending: FAMILY MEDICINE

## 2023-01-19 VITALS — HEART RATE: 78 BPM | OXYGEN SATURATION: 98 % | SYSTOLIC BLOOD PRESSURE: 124 MMHG | DIASTOLIC BLOOD PRESSURE: 80 MMHG

## 2023-01-19 DIAGNOSIS — R07.9 CHEST PAIN, UNSPECIFIED TYPE: ICD-10-CM

## 2023-01-19 DIAGNOSIS — R06.02 SHORTNESS OF BREATH: ICD-10-CM

## 2023-01-19 LAB
CHEST PAIN STATEMENT: NORMAL
MAX DIASTOLIC BP: 79 MMHG
MAX HEART RATE: 162 BPM
MAX HR PERCENT: 91 %
MAX HR: 162 BPM
MAX PREDICTED HEART RATE: 177 BPM
MAX. SYSTOLIC BP: 151 MMHG
PROTOCOL NAME: NORMAL
RATE PRESSURE PRODUCT: NORMAL
REASON FOR TERMINATION: NORMAL
SL CV STRESS RECOVERY BP: NORMAL MMHG
SL CV STRESS RECOVERY HR: 85 BPM
SL CV STRESS RECOVERY O2 SAT: 98 %
SL CV STRESS STAGE REACHED: 3
STRESS ANGINA INDEX: 0
STRESS BASELINE BP: NORMAL MMHG
STRESS BASELINE HR: 78 BPM
STRESS DUKE TREADMILL SCORE: 7
STRESS O2 SAT REST: 98 %
STRESS PEAK HR: 160 BPM
STRESS POST ESTIMATED WORKLOAD: 8.7 METS
STRESS POST EXERCISE DUR MIN: 6 MIN
STRESS POST EXERCISE DUR SEC: 31 SEC
STRESS POST O2 SAT PEAK: 98 %
STRESS POST PEAK BP: 150 MMHG
STRESS ST DEPRESSION: 0 MM
TARGET HR FORMULA: NORMAL
TEST INDICATION: NORMAL
TIME IN EXERCISE PHASE: NORMAL

## 2023-02-22 ENCOUNTER — VBI (OUTPATIENT)
Dept: ADMINISTRATIVE | Facility: OTHER | Age: 43
End: 2023-02-22

## 2023-06-02 ENCOUNTER — VBI (OUTPATIENT)
Dept: ADMINISTRATIVE | Facility: OTHER | Age: 43
End: 2023-06-02

## 2023-09-19 ENCOUNTER — TELEPHONE (OUTPATIENT)
Dept: FAMILY MEDICINE CLINIC | Facility: CLINIC | Age: 43
End: 2023-09-19

## 2023-09-19 NOTE — TELEPHONE ENCOUNTER
Very good job on the smoking cessation, yes okay for virtual.    Nikky Moore [facility]  9/19/2023 3:21 PM

## 2023-09-19 NOTE — TELEPHONE ENCOUNTER
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 Has limited transportation and is very emotional right now due to stress. Pt is asking if she is able to have a virtual visit to talk about something to help her relax and be able to sleep. Pt states she has not been out of her house except for a few times in the past year. Pt was crying and very emotional on the call. Ok to schedule virtual visit? Would also like to inform Dr Jacob Chairez that in 9 days she will be a year smoke free.      Please advise

## 2023-09-19 NOTE — TELEPHONE ENCOUNTER
Spoke w pt - scheduled - virtual ok, for 09/25/2023 @ 4:00 Pm, instruction provided.        Next Appt  With Family Medicine Giana Calabrese DO)  09/25/2023 at 4:00 PM

## 2023-09-25 ENCOUNTER — TELEMEDICINE (OUTPATIENT)
Dept: FAMILY MEDICINE CLINIC | Facility: CLINIC | Age: 43
End: 2023-09-25
Payer: COMMERCIAL

## 2023-09-25 DIAGNOSIS — F43.0 ACUTE STRESS REACTION: ICD-10-CM

## 2023-09-25 DIAGNOSIS — F33.1 MODERATE RECURRENT MAJOR DEPRESSION (HCC): ICD-10-CM

## 2023-09-25 DIAGNOSIS — F41.1 GAD (GENERALIZED ANXIETY DISORDER): Primary | ICD-10-CM

## 2023-09-25 PROCEDURE — 99214 OFFICE O/P EST MOD 30 MIN: CPT | Performed by: FAMILY MEDICINE

## 2023-09-25 RX ORDER — BUSPIRONE HYDROCHLORIDE 5 MG/1
5 TABLET ORAL 2 TIMES DAILY
Qty: 60 TABLET | Refills: 5 | Status: SHIPPED | OUTPATIENT
Start: 2023-09-25 | End: 2023-09-25

## 2023-09-25 RX ORDER — BUSPIRONE HYDROCHLORIDE 5 MG/1
5 TABLET ORAL 2 TIMES DAILY
Qty: 60 TABLET | Refills: 5 | Status: SHIPPED | OUTPATIENT
Start: 2023-09-25

## 2023-09-25 NOTE — PROGRESS NOTES
Virtual Regular Visit    Verification of patient location:    Patient is located at Home in the following state in which I hold an active license [place]      Assessment/Plan:    Problem List Items Addressed This Visit        Other    Moderate recurrent major depression (HCC)    Relevant Medications    busPIRone (BUSPAR) 5 mg tablet   Other Visit Diagnoses     LARISA (generalized anxiety disorder)    -  Primary    Relevant Medications    busPIRone (BUSPAR) 5 mg tablet    Acute stress reaction        Relevant Medications    busPIRone (BUSPAR) 5 mg tablet       F/U in 2 weeks. Reason for visit is   Chief Complaint   Patient presents with   • Virtual Brief Visit     Grandfather on hospice - mentally drained    • Anxiety   • Insomnia   • Virtual Regular Visit        Encounter provider Dex Conley DO    Provider located at [place] [address] [place] [id number] [phone]      Recent Visits  Date Type Provider Dept   09/19/23 Telephone Dex Conley, [address] [place] [address] recent visits within past 7 days and meeting all other requirements  Today's Visits  Date Type Provider Dept   09/25/23 1400 [facility], [facility] [address] [facility]   Showing today's visits and meeting all other requirements  Future Appointments  No visits were found meeting these conditions. Showing future appointments within next 150 days and meeting all other requirements       The patient was identified by name and date of birth. Cliff Rae was informed that this is a telemedicine visit and that the visit is being conducted through the [employer]. She agrees to proceed. .  My office door was closed. No one else was in the room. She acknowledged consent and understanding of privacy and security of the video platform.  The patient has agreed to participate and understands they can discontinue the visit at any time. Patient is aware this is a billable service. Subjective  Emmie Alba is a 39 y.o. female. HPI   Patient presents for virtual visit for mental health follow up. Notes that she has been very anxious and exhausted as her grandfather's care giver. States that does not have a lot of support and she feels very very burnt out. The hospice nurses and CNA have been very helpful for her. Has left the house 11 times since April 2023, the last two weeks he has been bed bound. Notes sleeping well, a few hours per night. Sleeping in the room with her grandfather because he is very restless. LARISA-7 Flowsheet Screening    Flowsheet Row Most Recent Value   Over the last 2 weeks, how often have you been bothered by any of the following problems? Feeling nervous, anxious, or on edge 3   Not being able to stop or control worrying 3   Worrying too much about different things 2   Trouble relaxing 3   Being so restless that it is hard to sit still 2   Becoming easily annoyed or irritable 3   Feeling afraid as if something awful might happen 3   LARISA-7 Total Score 19        PHQ-2/9 Depression Screening    Little interest or pleasure in doing things: 1 - several days  Feeling down, depressed, or hopeless: 2 - more than half the days  Trouble falling or staying asleep, or sleeping too much: 3 - nearly every day  Feeling tired or having little energy: 3 - nearly every day  Poor appetite or overeating: 3 - nearly every day  Feeling bad about yourself - or that you are a failure or have let yourself or your family down: 2 - more than half the days  Trouble concentrating on things, such as reading the newspaper or watching television: 0 - not at all  Moving or speaking so slowly that other people could have noticed.  Or the opposite - being so fidgety or restless that you have been moving around a lot more than usual: 2 - more than half the days  Thoughts that you would be better off dead, or of hurting yourself in some way: 0 - not at all  PHQ-9 Score: 16   PHQ-9 Interpretation: Moderately severe depression          Past Medical History:   Diagnosis Date   • Broken shoulder    • Morbid obesity with BMI of 40.0-44.9, adult ([address])    • Torn rotator cuff        Past Surgical History:   Procedure Laterality Date   • CYST REMOVAL     • ROTATOR CUFF REPAIR  10/22/2020   • SHOULDER SURGERY  12/22/2020       Current Outpatient Medications   Medication Sig Dispense Refill   • busPIRone (BUSPAR) 5 mg tablet Take 1 tablet (5 mg total) by mouth 2 (two) times a day 60 tablet 5   • albuterol (ProAir HFA) 90 mcg/act inhaler Inhale 2 puffs every 6 (six) hours as needed for wheezing 8.5 g 2   • calcium gluconate 500 mg tablet Take 500 mg by mouth daily     • citalopram (CeleXA) 10 mg tablet TAKE ONE TABLET BY MOUTH EVERY DAY (Patient not taking: Reported on 9/25/2023) 30 tablet 0   • co-enzyme Q-10 30 MG capsule Take 30 mg by mouth daily (Patient not taking: Reported on 12/6/2022)     • COLLAGEN PO Take 1 capsule by mouth daily (Patient not taking: Reported on 9/25/2023)     • ergocalciferol (ERGOCALCIFEROL) 1.25 MG (41527 UT) capsule Take 50,000 Units by mouth 2 (two) times a week  (Patient not taking: Reported on 12/6/2022)     • Fish Oil-Cholecalciferol (OMEGA-3 GUMMIES PO) Take 1 tablet by mouth daily  (Patient not taking: Reported on 12/6/2022)     • multivitamin (THERAGRAN) TABS Take 1 tablet by mouth daily (Patient not taking: Reported on 9/25/2023)       No current facility-administered medications for this visit. Allergies   Allergen Reactions   • Avocado - Food Allergy Angioedema   • Peanut-Containing Drug Products - Food Allergy      Review of Systems    Video Exam    There were no vitals filed for this visit. Physical Exam  Vitals reviewed. Constitutional:       General: She is not in acute distress. Appearance: Normal appearance.    HENT:      Head: Normocephalic and atraumatic. Right Ear: External ear normal.      Left Ear: External ear normal.      Nose: Nose normal.      Mouth/Throat:      Mouth: Mucous membranes are moist.   Eyes:      Extraocular Movements: Extraocular movements intact. Conjunctiva/sclera: Conjunctivae normal.   Pulmonary:      Effort: Pulmonary effort is normal.   Skin:     Capillary Refill: Capillary refill takes less than 2 seconds. Neurological:      Mental Status: She is alert. Mental status is at baseline.    Psychiatric:      Comments: Tearful during encounter          Visit Time  Total Visit Duration: [address], [facility]  9/25/2023 4:07 PM

## 2023-10-17 ENCOUNTER — OFFICE VISIT (OUTPATIENT)
Dept: FAMILY MEDICINE CLINIC | Facility: CLINIC | Age: 43
End: 2023-10-17
Payer: COMMERCIAL

## 2023-10-17 VITALS
WEIGHT: 293 LBS | HEIGHT: 67 IN | OXYGEN SATURATION: 97 % | DIASTOLIC BLOOD PRESSURE: 76 MMHG | HEART RATE: 95 BPM | SYSTOLIC BLOOD PRESSURE: 128 MMHG | BODY MASS INDEX: 45.99 KG/M2 | TEMPERATURE: 98.5 F

## 2023-10-17 DIAGNOSIS — F41.1 GAD (GENERALIZED ANXIETY DISORDER): Primary | ICD-10-CM

## 2023-10-17 DIAGNOSIS — F33.1 MODERATE RECURRENT MAJOR DEPRESSION (HCC): ICD-10-CM

## 2023-10-17 DIAGNOSIS — G89.29 CHRONIC LEFT SHOULDER PAIN: ICD-10-CM

## 2023-10-17 DIAGNOSIS — M72.2 PLANTAR FASCIITIS: ICD-10-CM

## 2023-10-17 DIAGNOSIS — Z12.31 ENCOUNTER FOR SCREENING MAMMOGRAM FOR MALIGNANT NEOPLASM OF BREAST: ICD-10-CM

## 2023-10-17 DIAGNOSIS — M25.512 CHRONIC LEFT SHOULDER PAIN: ICD-10-CM

## 2023-10-17 PROCEDURE — 99214 OFFICE O/P EST MOD 30 MIN: CPT | Performed by: FAMILY MEDICINE

## 2023-10-17 RX ORDER — MELATONIN
5000 DAILY
COMMUNITY

## 2023-10-17 RX ORDER — NAPROXEN 500 MG/1
500 TABLET ORAL 2 TIMES DAILY WITH MEALS
Qty: 30 TABLET | Refills: 0 | Status: SHIPPED | OUTPATIENT
Start: 2023-10-17

## 2023-10-17 NOTE — PROGRESS NOTES
Assessment/Plan:    No problem-specific Assessment & Plan notes found for this encounter. Diagnoses and all orders for this visit:    LARISA (generalized anxiety disorder)  Moderate recurrent major depression ([address])  Continue with Buspar and follow up in 2 months. Chronic left shoulder pain  -     Ambulatory Referral to Orthopedic Surgery; Future  -     XR shoulder 2+ vw left; Future  -     naproxen (Naprosyn) 500 mg tablet; Take 1 tablet (500 mg total) by mouth 2 (two) times a day with meals    Encounter for screening mammogram for malignant neoplasm of breast  -     Mammo screening bilateral w 3d & cad; Future    Plantar fasciitis  -     naproxen (Naprosyn) 500 mg tablet; Take 1 tablet (500 mg total) by mouth 2 (two) times a day with meals    Other orders  -     cholecalciferol (VITAMIN D3) 1,000 units tablet; Take 5,000 Units by mouth daily Gummy        Subjective:      Patient ID: Hayden Wu is a 39 y.o. female. HPI  Patient presents to the office for follow up. Reports that the Buspar has helped her feel a lot better. Notes that her Pop passed 2 days after her last appointment. Notes that this a bittersweet time for her. States that her stressors have changed. Reports that she is looking for a job. LARISA-7 Flowsheet Screening    Flowsheet Row Most Recent Value   Over the last 2 weeks, how often have you been bothered by any of the following problems?     Feeling nervous, anxious, or on edge 2   Not being able to stop or control worrying 2   Worrying too much about different things 1   Trouble relaxing 1   Being so restless that it is hard to sit still 1   Becoming easily annoyed or irritable 1   Feeling afraid as if something awful might happen 1   LARISA-7 Total Score 9        PHQ-2/9 Depression Screening    Little interest or pleasure in doing things: 1 - several days  Feeling down, depressed, or hopeless: 1 - several days  Trouble falling or staying asleep, or sleeping too much: 3 - nearly every day  Feeling tired or having little energy: 2 - more than half the days  Poor appetite or overeatin - more than half the days  Feeling bad about yourself - or that you are a failure or have let yourself or your family down: 1 - several days  Trouble concentrating on things, such as reading the newspaper or watching television: 1 - several days  Moving or speaking so slowly that other people could have noticed. Or the opposite - being so fidgety or restless that you have been moving around a lot more than usual: 1 - several days  Thoughts that you would be better off dead, or of hurting yourself in some way: 0 - not at all  PHQ-9 Score: 12   PHQ-9 Interpretation: Moderate depression        Left shoulder is very bothersome. States that she has a history of torn rotator cuff on the right and this feels the same. Reports that her left 4th finger is painful still and stiff after the break. States that she finds that things are improving. Both of her feet hurt, left greater than right. States that this is a sharp pain when she stands up or first thing in the morning. Located on the bottom of her feet. Has been worse since gaining weight. Notes that she has a skin tag on the left breast. This has grown over the last 6-7 months. Reports that it is normal in color, non tender. The following portions of the patient's history were reviewed and updated as appropriate: allergies, current medications, past family history, past medical history, past social history, past surgical history, and problem list.    Review of Systems      Objective:  /76   Pulse 95   Temp 98.5 °F (36.9 °C)   Ht 5' 7" (1.702 m)   Wt (!) 146 kg (322 lb)   SpO2 97%   BMI 50.43 kg/m²      Physical Exam  Vitals reviewed. Constitutional:       General: She is not in acute distress. Appearance: Normal appearance. HENT:      Head: Normocephalic and atraumatic.       Right Ear: External ear normal.      Left Ear: External ear normal.      Nose: Nose normal.      Mouth/Throat:      Mouth: Mucous membranes are moist.   Eyes:      Extraocular Movements: Extraocular movements intact. Conjunctiva/sclera: Conjunctivae normal.      Pupils: Pupils are equal, round, and reactive to light. Cardiovascular:      Rate and Rhythm: Normal rate and regular rhythm. Heart sounds: Normal heart sounds. Pulmonary:      Effort: Pulmonary effort is normal.      Breath sounds: Normal breath sounds. No wheezing, rhonchi or rales. Abdominal:      General: Bowel sounds are normal. There is no distension. Palpations: Abdomen is soft. Tenderness: There is no abdominal tenderness. Musculoskeletal:      Cervical back: Neck supple. Right lower leg: No edema. Left lower leg: No edema. Lymphadenopathy:      Cervical: No cervical adenopathy. Skin:     General: Skin is warm. Capillary Refill: Capillary refill takes less than 2 seconds. Findings: No rash. Comments: Left breast with skin tag. Neurological:      Mental Status: She is alert. Mental status is at baseline.          Pamela Sommers [facility]  10/17/2023 3:13 PM

## 2023-10-26 ENCOUNTER — APPOINTMENT (OUTPATIENT)
Dept: RADIOLOGY | Facility: CLINIC | Age: 43
End: 2023-10-26
Payer: COMMERCIAL

## 2023-10-26 ENCOUNTER — OFFICE VISIT (OUTPATIENT)
Dept: OBGYN CLINIC | Facility: CLINIC | Age: 43
End: 2023-10-26
Payer: COMMERCIAL

## 2023-10-26 VITALS
WEIGHT: 293 LBS | HEIGHT: 67 IN | HEART RATE: 73 BPM | BODY MASS INDEX: 45.99 KG/M2 | DIASTOLIC BLOOD PRESSURE: 76 MMHG | SYSTOLIC BLOOD PRESSURE: 126 MMHG

## 2023-10-26 DIAGNOSIS — S62.665S: Primary | ICD-10-CM

## 2023-10-26 DIAGNOSIS — M79.642 LEFT HAND PAIN: ICD-10-CM

## 2023-10-26 PROCEDURE — 73130 X-RAY EXAM OF HAND: CPT

## 2023-10-26 PROCEDURE — 99204 OFFICE O/P NEW MOD 45 MIN: CPT | Performed by: FAMILY MEDICINE

## 2023-10-26 NOTE — PATIENT INSTRUCTIONS
Over the counter:    -Diclofenac gel (voltaren) 3 times a day for 10 days    - Turmeric vitamin at  least 1000 mg daily    - Tart cherry vitamin at least 1000 mg daily    - Glucosamine-chondroitin 2 -3 times daily

## 2023-10-26 NOTE — PROGRESS NOTES
Assessment/Plan:  Assessment/Plan   Diagnoses and all orders for this visit:    Closed nondisplaced fracture of distal phalanx of left ring finger, sequela  -     XR hand 3+ vw left; Future          34-year-old right-hand-dominant female with left ring finger pain from multiple injuries with initial injury 8/30/2023. Discussed with patient physical exam, radiographs, impression, and plan. X-rays left hand noted for healing fracture dorsal proximal aspect of fourth distal phalanx. Physical exam left ring finger noted for tenderness at the DIP joint at the dorsal, radial, and ulnar aspects and also at the tuft of the digit. She has intact extension with flexion limited to 80 degrees. There is no appreciable collateral ligament laxity, however pain at the DIP joint with varus and valgus stress. She has decreased sensation to light touch palmar aspect of the fourth digit. She has normal radial pulse. Clinical impression is that she sustained acute fractures of fourth digit distal phalanx. Range of motion has been improving. Advised patient that invasive measures not warranted. She is to apply topical diclofenac gel 3 times daily for the next 10 days. She is to take turmeric at least 1000 mg daily, tart cherry at least 1000 mg daily, and glucosamine 2-3 times a day. She will follow-up as needed. Subjective:   Patient ID: Radha Esteban is a 39 y.o. female. Chief Complaint   Patient presents with    Left Hand - Pain        34-year-old right-hand-dominant female presents for evaluation of left ring finger pain. She reports initial onset of symptoms from a direct blow injury on 8/30/2023. She states she was in a physical location and used her left hand to block a blow. She had pain described as sudden in onset, localized to the tip/distal aspect of the ring finger, normal radiating, worse with bending the finger, and improved resting.   She started having swelling and associated bruising and had difficulty with bending the finger. She presented to urgent care where x-ray evaluation was noted for fracture of the distal phalanx. She was advised on splinting the digit. She wore splint for about 2 weeks. She states about 2 weeks ago she accidentally jammed her finger against the toilet which caused worsening pain. She has severe pain of the finger. 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 Hand Pain  This is a new problem. The current episode started more than 1 month ago. The problem occurs daily. The problem has been gradually improving. Associated symptoms include arthralgias and joint swelling. Pertinent negatives include no abdominal pain, chest pain, chills, fever, numbness, rash, sore throat or weakness. Exacerbated by: Direct pressure. She has tried rest, position changes and immobilization (Home exercise) for the symptoms. The treatment provided moderate relief. The following portions of the patient's history were reviewed and updated as appropriate: She  has a past medical history of Broken shoulder, Morbid obesity with BMI of 40.0-44.9, adult ([address]), and Torn rotator cuff. 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 .    Review of Systems   Constitutional:  Negative for chills and fever. HENT:  Negative for sore throat. Eyes:  Negative for visual disturbance. Respiratory:  Negative for shortness of breath. Cardiovascular:  Negative for chest pain. Gastrointestinal:  Negative for abdominal pain. Genitourinary:  Negative for flank pain. Musculoskeletal:  Positive for arthralgias and joint swelling. Skin:  Negative for rash and wound. Neurological:  Negative for weakness and numbness. Hematological:  Does not bruise/bleed easily. Psychiatric/Behavioral:  Negative for self-injury.         Objective:  Vitals:    10/26/23 1406   BP: 126/76   Pulse: 73   Weight: (!) 147 kg (324 lb)   Height: 5' 7" (1.702 m)      Left Hand Exam     Tenderness   Left hand tenderness location: ing finger noted for tenderness at the DIP joint at the dorsal, radial, and ulnar aspects and also at the tuft of the digit. Range of Motion   Wrist   Extension:  normal   Flexion:  normal   Hand   MP Ring: normal   PIP Ring: normal   DIP Rin     Muscle Strength   The patient has normal left wrist strength. Other   Sensation: normal  Pulse: present          Strength/Myotome Testing     Left Wrist/Hand   Normal wrist strength      Physical Exam  Vitals and nursing note reviewed. Constitutional:       Appearance: Normal appearance. She is well-developed. She is not ill-appearing or diaphoretic. HENT:      Head: Normocephalic and atraumatic. Right Ear: External ear normal.      Left Ear: External ear normal.   Eyes:      Conjunctiva/sclera: Conjunctivae normal.   Neck:      Trachea: No tracheal deviation. Cardiovascular:      Rate and Rhythm: Normal rate. Pulmonary:      Effort: Pulmonary effort is normal. No respiratory distress. Abdominal:      General: There is no distension. Musculoskeletal:         General: Swelling and tenderness present. No deformity or signs of injury. Skin:     General: Skin is warm and dry. Coloration: Skin is not jaundiced or pale. Neurological:      Mental Status: She is alert and oriented to person, place, and time. Psychiatric:         Mood and Affect: Mood normal.         Behavior: Behavior normal.         Thought Content: Thought content normal.         Judgment: Judgment normal.         I have personally reviewed pertinent films in PACS and my interpretation is  . X-rays left hand noted for healing fracture dorsal proximal aspect of fourth distal phalanx.

## 2023-11-02 ENCOUNTER — APPOINTMENT (OUTPATIENT)
Dept: RADIOLOGY | Facility: CLINIC | Age: 43
End: 2023-11-02
Payer: COMMERCIAL

## 2023-11-02 ENCOUNTER — OFFICE VISIT (OUTPATIENT)
Dept: OBGYN CLINIC | Facility: CLINIC | Age: 43
End: 2023-11-02
Payer: COMMERCIAL

## 2023-11-02 VITALS
SYSTOLIC BLOOD PRESSURE: 142 MMHG | HEART RATE: 73 BPM | DIASTOLIC BLOOD PRESSURE: 80 MMHG | BODY MASS INDEX: 44.41 KG/M2 | HEIGHT: 68 IN | WEIGHT: 293 LBS

## 2023-11-02 DIAGNOSIS — G89.29 CHRONIC LEFT SHOULDER PAIN: ICD-10-CM

## 2023-11-02 DIAGNOSIS — M25.512 LEFT SHOULDER PAIN, UNSPECIFIED CHRONICITY: ICD-10-CM

## 2023-11-02 DIAGNOSIS — M75.82 ROTATOR CUFF TENDONITIS, LEFT: Primary | ICD-10-CM

## 2023-11-02 DIAGNOSIS — M25.512 CHRONIC LEFT SHOULDER PAIN: ICD-10-CM

## 2023-11-02 PROCEDURE — 99214 OFFICE O/P EST MOD 30 MIN: CPT | Performed by: ORTHOPAEDIC SURGERY

## 2023-11-02 PROCEDURE — 20610 DRAIN/INJ JOINT/BURSA W/O US: CPT | Performed by: ORTHOPAEDIC SURGERY

## 2023-11-02 PROCEDURE — 73030 X-RAY EXAM OF SHOULDER: CPT

## 2023-11-02 RX ORDER — BUPIVACAINE HYDROCHLORIDE 2.5 MG/ML
2 INJECTION, SOLUTION INFILTRATION; PERINEURAL
Status: COMPLETED | OUTPATIENT
Start: 2023-11-02 | End: 2023-11-02

## 2023-11-02 RX ORDER — LIDOCAINE HYDROCHLORIDE 10 MG/ML
2 INJECTION, SOLUTION INFILTRATION; PERINEURAL
Status: COMPLETED | OUTPATIENT
Start: 2023-11-02 | End: 2023-11-02

## 2023-11-02 RX ORDER — METHYLPREDNISOLONE ACETATE 40 MG/ML
2 INJECTION, SUSPENSION INTRA-ARTICULAR; INTRALESIONAL; INTRAMUSCULAR; SOFT TISSUE
Status: COMPLETED | OUTPATIENT
Start: 2023-11-02 | End: 2023-11-02

## 2023-11-02 RX ADMIN — LIDOCAINE HYDROCHLORIDE 2 ML: 10 INJECTION, SOLUTION INFILTRATION; PERINEURAL at 14:30

## 2023-11-02 RX ADMIN — BUPIVACAINE HYDROCHLORIDE 2 ML: 2.5 INJECTION, SOLUTION INFILTRATION; PERINEURAL at 14:30

## 2023-11-02 RX ADMIN — METHYLPREDNISOLONE ACETATE 2 ML: 40 INJECTION, SUSPENSION INTRA-ARTICULAR; INTRALESIONAL; INTRAMUSCULAR; SOFT TISSUE at 14:30

## 2023-11-02 NOTE — PROGRESS NOTES
Patient Name:  Samra Martínez  MRN:  [id number]    [address] [facility]     1. Rotator cuff tendonitis, left  -     Ambulatory Referral to Physical Therapy; Future  -     Large joint arthrocentesis: L subacromial bursa    2. Chronic left shoulder pain  -     XR shoulder 2+ vw left; Future; Expected date: 11/02/2023  -     Ambulatory Referral to Orthopedic Surgery  -     Large joint arthrocentesis: L subacromial bursa        39 y.o. female with Left shoulder rotator cuff tendonitis. X-rays reviewed in office today with patient. Treatment options were discussed with the patient including physical therapy, corticosteroid injection. The patient was offered a corticosteroid injection for their left shoulder. Risks and benefits were discussed. They tolerated the procedure well. The patient was educated they may have some irritation in the next few days and should rest, ice, elevate and perform gentle range of motion exercises. They were advised the medicine should begin to work in a few days time. Advised the patient to avoid heavy lifting, pushing, pulling for the next several days. Avoid getting a massage for the next several days  A script was provided to begin physical therapy  The patient was shown gentle postural exercises to begin at home  Discussed future referral to spine and pain management for consideration for trigger point injections due to extreme point tenderness in trapezius muscle   She will follow up in 3 months for reevaluation and consideration for repeat CSI    Chief Complaint     Left shoulder pain    History of the Present Illness     Samra Martínez is a RHD 39 y.o. female with Left shoulder pain persistent for the last 4 months. She is unable to sleep on the left side. She has not tried physical therapy or injections. 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 Her primary care giver prescribed Naproxen.  She is feeling sharp pain rated 7/10. She has a history of right greater tuberosity ORIF, rotator cuff repair, biceps tenodesis 12/22/20 [facility]. She denies numbness and tingling. Review of Systems     Review of Systems   Constitutional:  Negative for chills and fever. HENT:  Negative for ear pain and sore throat. Eyes:  Negative for pain and visual disturbance. Respiratory:  Negative for cough and shortness of breath. Cardiovascular:  Negative for chest pain and palpitations. Gastrointestinal:  Negative for abdominal pain and vomiting. Genitourinary:  Negative for dysuria and hematuria. Musculoskeletal:  Negative for arthralgias and back pain. Skin:  Negative for color change and rash. Neurological:  Negative for seizures and syncope. All other systems reviewed and are negative. Physical Exam     /80   Pulse 73   Ht 5' 7.5" (1.715 m)   Wt (!) 148 kg (326 lb 12.8 oz)   BMI 50.43 kg/m²     Left  Shoulder: Active range of motion   160 degrees forward flexion  150 degrees abduction  60 degrees external rotation   1 level restriction internal rotation    There is 5/5 strength with supraspinatus testing. There is 5/5 strength with infraspinatus testing. There is 5/5 strength with subscapularis testing. There is  tenderness present over the clavicle, biceps tendon, trapezius, posterior deltoid  Allen test is positive  [place]'s test is negative    Speed's test is Negative  The patient is neurovascularly intact distally in the extremity. Eyes:  Anicteric sclerae. Neck:  Supple. Lungs:  Normal respiratory effort. Cardiovascular:  Capillary refill is less than 2 seconds. Skin:  Intact without erythema. Neurologic:  Sensation grossly intact to light touch. Psychiatric:  Mood and affect are appropriate. Data Review     I have personally reviewed pertinent films in PACS, and my interpretation follows:    X-rays taken 11/2/23 of Left shoulder demonstrate no acute osseous abnormality. No significant degenerative changes. Past Medical History:   Diagnosis Date    Broken shoulder     Morbid obesity with BMI of 40.0-44.9, adult ([address])     Torn rotator cuff        Past Surgical History:   Procedure Laterality Date    CYST REMOVAL      ROTATOR CUFF REPAIR  10/22/2020    SHOULDER SURGERY Right 2020       Allergies   Allergen Reactions    Avocado - Food Allergy Angioedema    Peanut-Containing Drug Products - Food Allergy        Current Outpatient Medications on File Prior to Visit   Medication Sig Dispense Refill    albuterol (ProAir HFA) 90 mcg/act inhaler Inhale 2 puffs every 6 (six) hours as needed for wheezing 8.5 g 2    busPIRone (BUSPAR) 5 mg tablet Take 1 tablet (5 mg total) by mouth 2 (two) times a day 60 tablet 5    calcium gluconate 500 mg tablet Take 500 mg by mouth daily      cholecalciferol (VITAMIN D3) 1,000 units tablet Take 5,000 Units by mouth daily Gummy      naproxen (Naprosyn) 500 mg tablet Take 1 tablet (500 mg total) by mouth 2 (two) times a day with meals 30 tablet 0     No current facility-administered medications on file prior to visit.        Social History     Tobacco Use    Smoking status: Former     Packs/day: 0.00     Years: 24.00     Total pack years: 0.00     Types: Cigarettes     Start date: 0     Quit date: 2022     Years since quittin.0    Smokeless tobacco: Never    Tobacco comments:     Pt is in process of quitting   Vaping Use    Vaping Use: Former    Start date: 2017    Quit date: 2017    Substances: Nicotine   Substance Use Topics    Alcohol use: Yes     Comment: socially    Drug use: Never       Family History   Problem Relation Age of Onset    Mitral valve prolapse Mother     Gallbladder disease Mother     Lung cancer Father 59    Asthma Father     Alcohol abuse Father     Crohn's disease Sister     Lung cancer Maternal Grandmother     Breast cancer Cousin 47    No Known Problems Half-Sister     No Known Problems Maternal Aunt Breast cancer Paternal Aunt              Procedures Performed     Large joint arthrocentesis: L subacromial bursa  Universal Protocol:  Consent: Verbal consent obtained. Risks and benefits: risks, benefits and alternatives were discussed  Consent given by: patient  Time out: Immediately prior to procedure a "time out" was called to verify the correct patient, procedure, equipment, support staff and site/side marked as required.   Patient understanding: patient states understanding of the procedure being performed  Site marked: the operative site was marked  Patient identity confirmed: verbally with patient  Supporting Documentation  Indications: pain   Procedure Details  Location: shoulder - L subacromial bursa  Preparation: Patient was prepped and draped in the usual sterile fashion  Needle size: 22 G  Ultrasound guidance: no  Approach: posterior  Medications administered: 2 mL bupivacaine 0.25 %; 2 mL methylPREDNISolone acetate 40 mg/mL; 2 mL lidocaine 1 %    Patient tolerance: patient tolerated the procedure well with no immediate complications  Dressing:  Sterile dressing applied              Armand More DO  Scribe Attestation      I,:  Subhash Cabrales am acting as a scribe while in the presence of the attending physician.:       I,:  Armand More DO personally performed the services described in this documentation    as scribed in my presence.:

## 2023-11-02 NOTE — PROGRESS NOTES
Patient Name:  Laura Robles  MRN:  [id number]    [address]     1. Left shoulder pain, unspecified chronicity  -     XR shoulder 2+ vw left; Future; Expected date: 11/02/2023    2. Chronic left shoulder pain  -     Ambulatory Referral to Orthopedic Surgery        39 y.o. female with {Right, left-initial cap:5607}shoulder ***. X-rays reviewed in office today with patient. ***. Chief Complaint     {Right, left-initial cap:5607} shoulder pain    History of the Present Illness     Emmie Monahan is a ***HD 39 y.o. female with {Right, left-initial cap:5607} shoulder pain ***. ***    Review of Systems     Review of Systems    Physical Exam     Ht 5' 7.5" (1.715 m)   Wt (!) 148 kg (326 lb 12.8 oz)   BMI 50.43 kg/m²     {RIGHT/LEFT:48451} Shoulder: Active range of motion   *** degrees forward flexion  *** degrees abduction  *** degrees external rotation   *** internal rotation    Passive range of motion   *** degrees of forward flexion     There is *** tenderness present over the ***. There is ***/5 strength with external rotation testing at the side. Empty can testing is {POSITIVE/NEGATIVE:44060::"negative"}   Belly press test is {POSITIVE/NEGATIVE:23034::"negative"}  Allen test is {POSITIVE:51099::"negative "}  Lapine's test is {POSITIVE:86367::"negative "}   Speed's test is {positive, negative, equivocal:71888::"Negative"}  The scapula is depressed ***cm and protracted. The patient is neurovascularly intact distally in the extremity. Eyes:  Anicteric sclerae. Neck:  Supple. Lungs:  Normal respiratory effort. Cardiovascular:  Capillary refill is less than 2 seconds. Skin:  Intact without erythema. Neurologic:  Sensation grossly intact to light touch. Psychiatric:  Mood and affect are appropriate.     Data Review     I have personally reviewed pertinent films in PACS, and my interpretation follows:    X-rays taken *** of {RIGHT/LEFT:68504}shoulder demonstrate ***    Past Medical History:   Diagnosis Date    Broken shoulder     Morbid obesity with BMI of 40.0-44.9, adult ([address] [facility])     Torn rotator cuff        Past Surgical History:   Procedure Laterality Date    CYST REMOVAL      ROTATOR CUFF REPAIR  10/22/2020    SHOULDER SURGERY  2020       Allergies   Allergen Reactions    Avocado - Food Allergy Angioedema    Peanut-Containing Drug Products - Food Allergy        Current Outpatient Medications on File Prior to Visit   Medication Sig Dispense Refill    albuterol (ProAir HFA) 90 mcg/act inhaler Inhale 2 puffs every 6 (six) hours as needed for wheezing 8.5 g 2    busPIRone (BUSPAR) 5 mg tablet Take 1 tablet (5 mg total) by mouth 2 (two) times a day 60 tablet 5    calcium gluconate 500 mg tablet Take 500 mg by mouth daily      cholecalciferol (VITAMIN D3) 1,000 units tablet Take 5,000 Units by mouth daily Gummy      naproxen (Naprosyn) 500 mg tablet Take 1 tablet (500 mg total) by mouth 2 (two) times a day with meals 30 tablet 0     No current facility-administered medications on file prior to visit.        Social History     Tobacco Use    Smoking status: Former     Packs/day: 0.00     Years: 24.00     Total pack years: 0.00     Types: Cigarettes     Start date: 0     Quit date: 2022     Years since quittin.0    Smokeless tobacco: Never    Tobacco comments:     Pt is in process of quitting   Vaping Use    Vaping Use: Former    Start date: 2017    Quit date: 2017    Substances: Nicotine   Substance Use Topics    Alcohol use: Yes     Comment: socially    Drug use: Never       Family History   Problem Relation Age of Onset    Mitral valve prolapse Mother     Gallbladder disease Mother     Lung cancer Father 59    Asthma Father     Alcohol abuse Father     Crohn's disease Sister     Lung cancer Maternal Grandmother     Breast cancer Cousin 47    No Known Problems Half-Sister     No Known Problems Maternal Aunt     Breast cancer Paternal Aunt              Procedures Performed     Procedures  ***      Scribe Attestation      I,:   am acting as a scribe while in the presence of the attending physician.:       I,:   personally performed the services described in this documentation    as scribed in my presence.:

## 2023-11-14 DIAGNOSIS — M25.512 CHRONIC LEFT SHOULDER PAIN: ICD-10-CM

## 2023-11-14 DIAGNOSIS — G89.29 CHRONIC LEFT SHOULDER PAIN: ICD-10-CM

## 2023-11-14 DIAGNOSIS — M72.2 PLANTAR FASCIITIS: ICD-10-CM

## 2023-11-14 RX ORDER — NAPROXEN 500 MG/1
500 TABLET ORAL 2 TIMES DAILY WITH MEALS
Qty: 30 TABLET | Refills: 0 | Status: SHIPPED | OUTPATIENT
Start: 2023-11-14

## 2024-10-01 ENCOUNTER — TELEPHONE (OUTPATIENT)
Age: 44
End: 2024-10-01

## 2024-10-01 DIAGNOSIS — R23.2 HOT FLASHES: ICD-10-CM

## 2024-10-01 DIAGNOSIS — Z87.898 HISTORY OF PREDIABETES: ICD-10-CM

## 2024-10-01 DIAGNOSIS — E78.00 PURE HYPERCHOLESTEROLEMIA: Primary | ICD-10-CM

## 2024-10-01 NOTE — TELEPHONE ENCOUNTER
Patient called stated, she lost her insurance with the state, She will have insurance  on Nov 5th.    She has Scheduled an appt for annual physical 11/19/2024.    Pt states, she is having ankle, leg swelling, feeling very tired, when she falls asleep has hard time waking up ( feels like she has been drugged).  She complains of  day sweats, night sweats, she is drinking a lot of water, is struggling with weight loss, nails are brittle and breaking, has dry eyes. Pt thinks she now could be diabetic   ( Previously prediabetic.     Pt will be getting insurance through work, [employer].  Pt would like to start on a weight loss medication, wants to know what medication will be covered under her [facility] gold plan?    Is there something that patient can do to help her with her symptoms before her appt?    She doesn't have the money to schedule an appt sooner.    Pt is requesting lab work  prior to appt on 11/19/24.        Please call patient to advise.    Please Advise Dr Chairez.  Thank you

## 2024-10-02 NOTE — TELEPHONE ENCOUNTER
Labs ordered, she needs to hydrate, limit carbs and follow up at appointment. I do not know what her insurance will cover, she will have to talk to the insurance.     Jane Chairez DO  [facility]  10/2/2024 12:06 PM

## 2025-06-06 NOTE — TELEPHONE ENCOUNTER
Pt states that she is on Chantix day 9  Pt states that she is having lung pain  Pt states that she has a mild cough  She was advised that if these symptoms persisted that she should call the office  Pt would like to go see her 95YO grandfather who is in the hospital for a heart attack  Pt states she has bad seasonal allergies but wanted advisement  36.8